# Patient Record
Sex: MALE | Race: OTHER
[De-identification: names, ages, dates, MRNs, and addresses within clinical notes are randomized per-mention and may not be internally consistent; named-entity substitution may affect disease eponyms.]

---

## 2020-03-09 PROBLEM — Z00.00 ENCOUNTER FOR PREVENTIVE HEALTH EXAMINATION: Status: ACTIVE | Noted: 2020-03-09

## 2020-03-12 ENCOUNTER — APPOINTMENT (OUTPATIENT)
Dept: ORTHOPEDIC SURGERY | Facility: CLINIC | Age: 55
End: 2020-03-12
Payer: MEDICARE

## 2020-03-15 ENCOUNTER — FORM ENCOUNTER (OUTPATIENT)
Age: 55
End: 2020-03-15

## 2020-03-16 ENCOUNTER — OUTPATIENT (OUTPATIENT)
Dept: OUTPATIENT SERVICES | Facility: HOSPITAL | Age: 55
LOS: 1 days | End: 2020-03-16
Payer: MEDICARE

## 2020-03-16 ENCOUNTER — APPOINTMENT (OUTPATIENT)
Dept: ORTHOPEDIC SURGERY | Facility: CLINIC | Age: 55
End: 2020-03-16
Payer: MEDICARE

## 2020-03-16 VITALS — OXYGEN SATURATION: 98 % | DIASTOLIC BLOOD PRESSURE: 70 MMHG | HEART RATE: 64 BPM | SYSTOLIC BLOOD PRESSURE: 128 MMHG

## 2020-03-16 DIAGNOSIS — Z86.39 PERSONAL HISTORY OF OTHER ENDOCRINE, NUTRITIONAL AND METABOLIC DISEASE: ICD-10-CM

## 2020-03-16 DIAGNOSIS — M25.531 PAIN IN RIGHT WRIST: ICD-10-CM

## 2020-03-16 DIAGNOSIS — Z80.1 FAMILY HISTORY OF MALIGNANT NEOPLASM OF TRACHEA, BRONCHUS AND LUNG: ICD-10-CM

## 2020-03-16 PROCEDURE — 99204 OFFICE O/P NEW MOD 45 MIN: CPT | Mod: 25

## 2020-03-16 PROCEDURE — 73110 X-RAY EXAM OF WRIST: CPT | Mod: 26,RT

## 2020-03-16 PROCEDURE — 73110 X-RAY EXAM OF WRIST: CPT

## 2020-03-16 PROCEDURE — 20611 DRAIN/INJ JOINT/BURSA W/US: CPT | Mod: RT

## 2020-03-16 RX ORDER — GABAPENTIN 600 MG/1
600 TABLET, COATED ORAL
Refills: 0 | Status: ACTIVE | COMMUNITY

## 2020-03-16 RX ORDER — METFORMIN HYDROCHLORIDE 500 MG/1
500 TABLET, COATED ORAL
Refills: 0 | Status: ACTIVE | COMMUNITY

## 2020-03-16 RX ORDER — ROSUVASTATIN CALCIUM 40 MG/1
40 TABLET, FILM COATED ORAL
Refills: 0 | Status: ACTIVE | COMMUNITY

## 2020-03-16 RX ORDER — KRILL/OM-3/DHA/EPA/PHOSPHO/AST 1000-230MG
CAPSULE ORAL
Refills: 0 | Status: ACTIVE | COMMUNITY

## 2020-03-16 NOTE — HISTORY OF PRESENT ILLNESS
[8] : a current pain level of 8/10 [de-identified] : The patient is a 54 year old, RHD man with history of controlled DM presenting with right wrist pain.\par \par The patient presents with a 1.5 month history of atraumatic, right volar wrist pain and numbness.  He has a history of remote left carpal tunnel syndrome s/p carpal tunnel release which provided permanent relief of symptoms.  He has apparently had EMG/NCS studies confirming his diagnosis.  His right wrist symptoms are somewhat similar to his left wrist symptoms.  He denies atrophy of hand muscles.  His numbness radiates to the majority of his hand.  He denies joint swelling.  He denies focal neck pain.  His pain sometimes radiates up his forearm.  In the past, he has tried bracing, NSAIDs, and had a cortisone injection greater than one year ago which provided significant long term relief.\par \par Pain is rated 8/10, described a shooting/throbbing, improved with NSAIDs, worse with lying down.

## 2020-03-16 NOTE — PROCEDURE
[de-identified] : Ultrasound Guided Injection \par Indication: Ensure placement within the carpal tunnel, with close proximity to the median nerve, without damaging the tendons\par \par Utlizing the Adaptive TCR  portable ultrasound machine, the Linear transducer, sterilized probe, using ultrasound guidance with the probe in the transverse axis, utilizing an out of plane approach, was used for the following injection:\par \par Injection: RIGHT wrist.\par Indication: Carpal Tunnel Syndrome.\par \par A discussion was had with the patient regarding this procedure and all questions were answered. All risks, benefits and alternatives were discussed. These included but were not limited to bleeding, infection, and allergic reaction. A timeout was performed prior to the procedure to ensure proper side.  Alcohol was used to clean and sterilize the skin over the volar aspect of the MCP. A 25-gauge needle was used to inject 0.5cc of lidocaine 1% without epinephrine, 0.5CC of 0.5% bupivicaine and 1cc of 6mg/mL betamethasone into the carpal tunnel from a volar ulnar approach with the wrist in flexion. A sterile bandage was then applied. The patient tolerated the procedure well and there were no complications.

## 2020-03-16 NOTE — DISCUSSION/SUMMARY
[Medication Risks Reviewed] : Medication risks reviewed [de-identified] : The patient is a 54 year old, RHD man presenting with chronic, volar wrist pain and numbness likely secondary to carpal-tunnel syndrome/median compressive neuropathy.\par \par Imaging was reviewed with the patient in detail.  All questions were answered appropriately.\par \par The patient was counseled on the natural progression of the problem today.  The patient was provided reassurance today.  \par \par After informed consent, and explanation of risks, benefits, alternatives, adverse effects of injection, which includes but is not limited to infection, bleeding, allergic reaction, swelling, failure to improve symptoms, the patient would like to proceed with the procedure - RIGHT WRIST ULTRASOUND-GUIDED CORTICOSTEROID INJECTION OF CARPAL TUNNEL. See procedure note above. Patient tolerated the procedure well. The patient was provided with postinjection instructions.\par \par He has done a course of conservative treatment.  We discussed consultation with hand/wrist surgery if cortisone injection does not provide relief.  He was given referral to hand/wrist surgery.\par \par Follow-up in 6 weeks or as needed.\par \par Patient appreciates and agrees with current plan.\par \par This note was generated using dragon medical dictation software.  A reasonable effort has been made for proofreading its contents, but typos may still remain.  If there are any questions or points of clarification needed please notify my office.\par \par \par

## 2020-03-16 NOTE — PHYSICAL EXAM
[de-identified] : General: Well-nourished, well-developed, alert, and in no acute distress.\par Head: Normocephalic.\par Eyes: Pupils equal round reactive to light and accommodation, extraocular muscles intact, normal sclera.\par Nose: No nasal discharge.\par Cardiac: Regular rate. Extremities are warm and well perfused. Distal pulses are symmetric bilaterally.\par Respiratory: No labored breathing.\par Extremities: Sensation is intact distally bilaterally.  Distal pulses are symmetric bilaterally\par Neurologic: No focal deficits\par Skin: Normal skin color, texture, and turgor\par Psychiatric: Normal affect\par \par RIGHT WRIST:\par \par Inspection: no swelling, bruising, deformity\par Joint Effusion: no\par ROM: normal wrist extension, wrist flexion , supination , pronation \par Provocative: no pain with ulnar deviation, no pain with radial deviation\par Palpation: no pain in radial styloid, no pain at anatomical snuffbox, no  pain at a scaphoid tubercle, no pain in CMC, no pain at scapholunate, no pain at DRUJ, no pain at ulnar styloid, no pain at TFCC, no pain at dorsal compartment, no pain at volar compartment\par Strength: normal pinch grasp, thumb abduction, thumb adduction, thumb opposition, MCP flexion, MCP extension PIP flexion, PIP extension DIP flexion, DIP extension\par Distal Pulses: 2+, normal\par Upper Extremity Sensation: normal\par Upper Extremity Reflexes: normal\par \par Special Tests:\par CMC Grind Test: normal\par Tinel: POSITIVE\par Phalen: POSITIVE\par TFCC Compression: normal\par Finkelstein Test: normal\par \par LEFT WRIST:\par \par Inspection: no swelling, bruising, deformity\par Joint Effusion: no\par ROM: normal wrist extension, wrist flexion , supination , pronation \par Provocative: no pain with ulnar deviation, no pain with radial deviation\par Palpation: no pain in radial styloid, no pain at anatomical snuffbox, no  pain at a scaphoid tubercle, no pain in CMC, no pain at scapholunate, no pain at DRUJ, no pain at ulnar styloid, no pain at TFCC, no pain at dorsal compartment, no pain at volar compartment\par Strength: normal pinch grasp, thumb abduction, thumb adduction, thumb opposition, MCP flexion, MCP extension PIP flexion, PIP extension DIP flexion, DIP extension\par Distal Pulses: 2+, normal\par Upper Extremity Sensation: normal\par Upper Extremity Reflexes: normal\par \par  [de-identified] : Xray RIGHT Wrist - Multiple views were reviewed with the patient in detail.  There is no acute fracture or dislocation.  There is no joint effusion. Questionable narrowing of radiocarpal joint.  Cystic changes at Lunate-Triquetrum.  We will await formal radiology reading.\par

## 2021-01-26 ENCOUNTER — OUTPATIENT (OUTPATIENT)
Dept: OUTPATIENT SERVICES | Facility: HOSPITAL | Age: 56
LOS: 1 days | End: 2021-01-26
Payer: MEDICARE

## 2021-01-26 ENCOUNTER — APPOINTMENT (OUTPATIENT)
Dept: ORTHOPEDIC SURGERY | Facility: CLINIC | Age: 56
End: 2021-01-26
Payer: MEDICARE

## 2021-01-26 ENCOUNTER — RESULT REVIEW (OUTPATIENT)
Age: 56
End: 2021-01-26

## 2021-01-26 VITALS
WEIGHT: 210 LBS | DIASTOLIC BLOOD PRESSURE: 77 MMHG | HEIGHT: 74 IN | HEART RATE: 72 BPM | OXYGEN SATURATION: 98 % | BODY MASS INDEX: 26.95 KG/M2 | TEMPERATURE: 97.4 F | SYSTOLIC BLOOD PRESSURE: 116 MMHG

## 2021-01-26 DIAGNOSIS — G89.29 CERVICALGIA: ICD-10-CM

## 2021-01-26 DIAGNOSIS — M54.2 CERVICALGIA: ICD-10-CM

## 2021-01-26 PROCEDURE — 72050 X-RAY EXAM NECK SPINE 4/5VWS: CPT

## 2021-01-26 PROCEDURE — 99072 ADDL SUPL MATRL&STAF TM PHE: CPT

## 2021-01-26 PROCEDURE — 72050 X-RAY EXAM NECK SPINE 4/5VWS: CPT | Mod: 26

## 2021-01-26 PROCEDURE — 99214 OFFICE O/P EST MOD 30 MIN: CPT

## 2021-02-09 NOTE — HISTORY OF PRESENT ILLNESS
[de-identified] : Referred by Dr. Mayberry\par \par Mr. PERALTA is a very pleasant 55 year old male who complains of neck pain for approximately the past 2-3 months, atraumatic. Occasional upper extremity pain, primarily in his hands. Patient denies extremity numbness and paresthesias. History of carpal tunnel, s/p remote left carpal tunnel release.\par \par The patient had surgery on his lumbar spine in 2002.\par \par The patient has no history of unexpected weight loss, no history of active cancer, no history bladder or bowel dysfunction, no night pain, no fevers or chills.\par \par The past medical history, surgical history, family history, allergies, medications, 10+ point review of systems, family history and social history were reviewed and non contributory.\par \par

## 2021-02-09 NOTE — DISCUSSION/SUMMARY
[de-identified] : Discussed the results of the patient's history, physical exam, and imaging. There is no surgical indication for intervention at this time. I am recommending a course of physical therapy, referral given.  The patient will follow up with me at the end of the physical therapy course, in approximately 2-3 months, sooner if there is an issue.  All questions were answered.\par \par

## 2021-02-09 NOTE — PHYSICAL EXAM
[de-identified] : MRI cervical 12/2020 stand-up (my read): multilevel disc degeneration and subligamentous disc herniations; no cord compression or cord signal change\par \par XR cervical 1/2021 (my read) multilevel degenerative changes with anterior osteophytes, no dynamic instability, no acute fractures seen

## 2022-09-28 RX ORDER — NAPROXEN 500 MG/1
TABLET ORAL
Refills: 0 | Status: ACTIVE | COMMUNITY

## 2022-09-28 RX ORDER — CELECOXIB 200 MG/1
200 CAPSULE ORAL
Refills: 0 | Status: ACTIVE | COMMUNITY

## 2022-10-12 ENCOUNTER — APPOINTMENT (OUTPATIENT)
Dept: SPINE | Facility: CLINIC | Age: 57
End: 2022-10-12

## 2022-10-12 ENCOUNTER — NON-APPOINTMENT (OUTPATIENT)
Age: 57
End: 2022-10-12

## 2023-01-31 ENCOUNTER — NON-APPOINTMENT (OUTPATIENT)
Age: 58
End: 2023-01-31

## 2023-01-31 ENCOUNTER — APPOINTMENT (OUTPATIENT)
Dept: SPINE | Facility: CLINIC | Age: 58
End: 2023-01-31

## 2023-01-31 VITALS
BODY MASS INDEX: 28.88 KG/M2 | OXYGEN SATURATION: 99 % | HEIGHT: 74 IN | HEART RATE: 77 BPM | WEIGHT: 225 LBS | RESPIRATION RATE: 18 BRPM | SYSTOLIC BLOOD PRESSURE: 136 MMHG | TEMPERATURE: 97 F | DIASTOLIC BLOOD PRESSURE: 81 MMHG

## 2023-01-31 DIAGNOSIS — M54.10 RADICULOPATHY, SITE UNSPECIFIED: ICD-10-CM

## 2023-01-31 DIAGNOSIS — Z78.9 OTHER SPECIFIED HEALTH STATUS: ICD-10-CM

## 2023-01-31 DIAGNOSIS — M54.50 LOW BACK PAIN, UNSPECIFIED: ICD-10-CM

## 2023-01-31 DIAGNOSIS — Z86.39 PERSONAL HISTORY OF OTHER ENDOCRINE, NUTRITIONAL AND METABOLIC DISEASE: ICD-10-CM

## 2023-01-31 RX ORDER — GABAPENTIN 400 MG/1
CAPSULE ORAL
Refills: 0 | Status: DISCONTINUED | COMMUNITY
End: 2023-01-31

## 2023-02-14 ENCOUNTER — APPOINTMENT (OUTPATIENT)
Dept: SPINE | Facility: CLINIC | Age: 58
End: 2023-02-14
Payer: MEDICARE

## 2023-02-14 ENCOUNTER — RESULT REVIEW (OUTPATIENT)
Age: 58
End: 2023-02-14

## 2023-02-14 ENCOUNTER — NON-APPOINTMENT (OUTPATIENT)
Age: 58
End: 2023-02-14

## 2023-02-14 ENCOUNTER — OUTPATIENT (OUTPATIENT)
Dept: OUTPATIENT SERVICES | Facility: HOSPITAL | Age: 58
LOS: 1 days | End: 2023-02-14
Payer: MEDICARE

## 2023-02-14 VITALS
WEIGHT: 225 LBS | OXYGEN SATURATION: 98 % | RESPIRATION RATE: 18 BRPM | TEMPERATURE: 97 F | DIASTOLIC BLOOD PRESSURE: 79 MMHG | SYSTOLIC BLOOD PRESSURE: 127 MMHG | BODY MASS INDEX: 28.88 KG/M2 | HEIGHT: 74 IN | HEART RATE: 67 BPM

## 2023-02-14 DIAGNOSIS — M47.812 SPONDYLOSIS W/OUT MYELOPATHY OR RADICULOPATHY, CERVICAL REGION: ICD-10-CM

## 2023-02-14 DIAGNOSIS — M54.12 RADICULOPATHY, CERVICAL REGION: ICD-10-CM

## 2023-02-14 DIAGNOSIS — M48.02 SPINAL STENOSIS, CERVICAL REGION: ICD-10-CM

## 2023-02-14 DIAGNOSIS — R20.2 ANESTHESIA OF SKIN: ICD-10-CM

## 2023-02-14 DIAGNOSIS — R20.0 ANESTHESIA OF SKIN: ICD-10-CM

## 2023-02-14 PROCEDURE — 72052 X-RAY EXAM NECK SPINE 6/>VWS: CPT

## 2023-02-14 PROCEDURE — 99204 OFFICE O/P NEW MOD 45 MIN: CPT

## 2023-02-14 PROCEDURE — 72052 X-RAY EXAM NECK SPINE 6/>VWS: CPT | Mod: 26

## 2023-02-22 ENCOUNTER — APPOINTMENT (OUTPATIENT)
Dept: ORTHOPEDIC SURGERY | Facility: CLINIC | Age: 58
End: 2023-02-22
Payer: MEDICARE

## 2023-02-22 ENCOUNTER — NON-APPOINTMENT (OUTPATIENT)
Age: 58
End: 2023-02-22

## 2023-02-22 DIAGNOSIS — M77.11 LATERAL EPICONDYLITIS, RIGHT ELBOW: ICD-10-CM

## 2023-02-22 PROCEDURE — 73070 X-RAY EXAM OF ELBOW: CPT | Mod: RT

## 2023-02-22 PROCEDURE — 99203 OFFICE O/P NEW LOW 30 MIN: CPT

## 2023-02-22 PROCEDURE — 73110 X-RAY EXAM OF WRIST: CPT | Mod: RT

## 2023-04-10 ENCOUNTER — APPOINTMENT (OUTPATIENT)
Age: 58
End: 2023-04-10
Payer: MEDICARE

## 2023-04-10 DIAGNOSIS — G56.21 LESION OF ULNAR NERVE, RIGHT UPPER LIMB: ICD-10-CM

## 2023-04-10 DIAGNOSIS — G56.01 CARPAL TUNNEL SYNDROME, RIGHT UPPER LIMB: ICD-10-CM

## 2023-04-10 PROCEDURE — 99203 OFFICE O/P NEW LOW 30 MIN: CPT

## 2023-04-10 PROCEDURE — 95911 NRV CNDJ TEST 9-10 STUDIES: CPT

## 2023-04-10 PROCEDURE — 95886 MUSC TEST DONE W/N TEST COMP: CPT

## 2023-04-10 NOTE — CONSULT LETTER
[Dear  ___] : Dear ~AVELINO, [Courtesy Letter:] : I had the pleasure of seeing your patient, [unfilled], in my office today. [Please see my note below.] : Please see my note below. [Sincerely,] : Sincerely, [FreeTextEntry3] : Toni Simpson MD

## 2023-04-10 NOTE — PHYSICAL EXAM
[FreeTextEntry1] : General: this is a pleasant patient in no acute distress\par \par HEENT conjunctiva are normal, no tenderness in head\par \par CV: normal pulses, regular rate and rhythm, no peripheral edema noted\par \par Lungs: breathing is non-labored\par \par abd: soft and non-distended\par \par MSK:\par SLR: \par RA:\par range of motion:\par tinnels: \par spurling:\par Occipital nerve tenderness:\par \par Mental status:\par Alert and oriented to person, place and time, normal speech and comprehension\par \par Cranial Nerves:\par extra-occular movements in tact without nystagmus, normal saccades and smooth pursuit, Face symmetric and facial strength symmetric, facial sensation symmetric, \par \par Motor: normal bulk and tone throughout. no abnormal movements.  Full 5/5 strength uppers and lower extremities proximally and distally except R hand intrinsics 4/5\par \par Sensory: in tact and symmetric to vibration, light tough, temperature\par \par Cerebellar: normal finger-nose-finger bilaterally\par \par Reflexes: 1+ in the upper and lower extremities and symmetric.  toes are bilaterally downgoing.\par \par Gait: stable, able to tip toe heel and tandem\par \par Stances:\par Romberg: normal\par \par \par

## 2023-04-10 NOTE — ASSESSMENT
[FreeTextEntry1] : EMG/NCS of right arm showed moderate right cubital and carpal tunnel syndrome\par \par EMG REPORT WILL BE UPLOADED SEPARATELY AS A PDF\par

## 2023-04-10 NOTE — DATA REVIEWED
[de-identified] : 2023: XR C spine C6-C7 mod spondylosis and froaminal narrowing with mild motion at C5-C6\par MRI C spine 2020 worst level C6-C7\par MRI C spine 2023 similar to 2020 with worst level C6-C7

## 2023-04-10 NOTE — HISTORY OF PRESENT ILLNESS
[FreeTextEntry1] : LIANNA PERALTA is a 57 year who is referred for clinical neurophysiological consultation and EMG/NCS for numbness and weakness and pain in the right hand. \par \par about 1 year of progressive symptoms. numbness in finger tips, difficulty closing hand. mainly middle 3 fingers into the palm and into the forearm which is painful.  can't tell provoking or alleviating factors.  needs to sleep with arm stretched forward, if he bends the elbow at night is better. does wake him from sleep most nights.  some issues opening jars and doing buttons.  shoulder is also weak.  no neck pain.  almost no symptoms in the left arm or feet. \par \par for 5 months he has been trying to sleep with the elbow straight and keeping it straight as much as possible during the day.\par \par Denies diplopia, blurred vision, dysphagia, dysarthria, aphasia, bowel or bladder dysfunction, imbalance, falls, headaches.

## 2024-08-27 NOTE — HISTORY OF PRESENT ILLNESS
[de-identified] : Mr. PERALTA is a 59 year old man who was referred by  __  for cervical lymphadenopathy.  He was noted to have right parotid enlargement.   US SOFT TISSUE NECK (6-) at [facility] - COMPARISON:  None - RIGHT parotid gland measures approximately 5.6 x 1.8 x 4.6 cm. No discrete mass, calculus, or ductal dilatation. - LEFT parotid gland measures 5.4 x 1.7 x 4.7 cm. No discrete mass, calculus, or ductal dilatation. - 2.3 x 0.5 x 1.3 cm right level 3 normal-appearing cervical lymph node. IMPRESSION: 1.)  Parotid glands are symmetric in size without discrete mass, calculus or ductal dilatation. 2.)  Nonenlarged normal-appearing right level 3 cervical lymph node. No suspicious lymph nodes identified. 3.)  If further imaging is clinically indicated CT scan or MRI examination of the neck without and with intravenous contrast may provide additional information.

## 2024-08-28 ENCOUNTER — NON-APPOINTMENT (OUTPATIENT)
Age: 59
End: 2024-08-28

## 2024-08-28 ENCOUNTER — APPOINTMENT (OUTPATIENT)
Dept: OTOLARYNGOLOGY | Facility: CLINIC | Age: 59
End: 2024-08-28

## 2024-08-28 VITALS
WEIGHT: 215 LBS | HEART RATE: 64 BPM | TEMPERATURE: 96.7 F | SYSTOLIC BLOOD PRESSURE: 131 MMHG | BODY MASS INDEX: 27.59 KG/M2 | HEIGHT: 74 IN | DIASTOLIC BLOOD PRESSURE: 97 MMHG

## 2024-08-28 DIAGNOSIS — F17.200 NICOTINE DEPENDENCE, UNSPECIFIED, UNCOMPLICATED: ICD-10-CM

## 2024-08-28 DIAGNOSIS — R59.0 LOCALIZED ENLARGED LYMPH NODES: ICD-10-CM

## 2024-08-28 PROCEDURE — 99204 OFFICE O/P NEW MOD 45 MIN: CPT | Mod: 25

## 2024-08-28 PROCEDURE — 31575 DIAGNOSTIC LARYNGOSCOPY: CPT

## 2024-08-28 NOTE — CONSULT LETTER
[Dear  ___] : Dear ~AVELINO, [( Thank you for referring [unfilled] for consultation for _____ )] : Thank you for referring [unfilled] for consultation for [unfilled] [Please see my note below.] : Please see my note below. [Consult Closing:] : Thank you very much for allowing me to participate in the care of this patient.  If you have any questions, please do not hesitate to contact me. [Sincerely,] : Sincerely,

## 2024-09-05 PROBLEM — R59.0 CERVICAL ADENOPATHY: Status: ACTIVE | Noted: 2024-08-28

## 2024-09-05 PROBLEM — F17.200 HEAVY SMOKER: Status: ACTIVE | Noted: 2024-08-28

## 2024-09-05 RX ORDER — OMEPRAZOLE 20 MG/1
20 CAPSULE, DELAYED RELEASE ORAL
Refills: 0 | Status: ACTIVE | COMMUNITY

## 2024-09-05 RX ORDER — SEMAGLUTIDE 1.34 MG/ML
2 INJECTION, SOLUTION SUBCUTANEOUS
Refills: 0 | Status: ACTIVE | COMMUNITY

## 2024-09-05 NOTE — PHYSICAL EXAM
[FreeTextEntry1] : No hoarseness.  [de-identified] : 2.5 cm soft mobile LN in right level 2A. [de-identified] : Thyroid gland normal size, no palpable nodules.  [de-identified] : 1+ bilateral  [de-identified] : Carotid pulses 2+ bilateral.  [de-identified] : See NECK

## 2024-09-05 NOTE — ASSESSMENT
[FreeTextEntry1] : Mr. PERALTA is a 59-year-old man who has a painless right level 2 lymph node, 2.3 cm on US, found on routine exam a few months ago.  He is a longtime smoker.   H&N exam is unremarkable other than the lymph node.  PLAN CT neck+ contrrast for better evaluation  Return in a few weeks

## 2024-09-05 NOTE — HISTORY OF PRESENT ILLNESS
[de-identified] : Mr. PERALTA is a 59-year-old man who was referred by SHANTELLE Rivera for cervical lymphadenopathy. He was noted to have right parotid enlargement by SHANTELLE Rivera on routine visit in May 2024.   Pt denies feeling mass, pressure, difficulty breathing, difficulty swallowing or voice change.  US Neck was done. Longtime smoker - 1/2 pack/day  for >30 years.  Occasional ETOH. Retired .  No [employer] 9/11 exposure.   US SOFT TISSUE NECK (6-) at [facility] - COMPARISON:  None - RIGHT parotid gland measures approximately 5.6 x 1.8 x 4.6 cm. No discrete mass, calculus, or ductal dilatation. - LEFT parotid gland measures 5.4 x 1.7 x 4.7 cm. No discrete mass, calculus, or ductal dilatation. - 2.3 x 0.5 x 1.3 cm right level 3 normal-appearing cervical lymph node. IMPRESSION: 1.)  Parotid glands are symmetric in size without discrete mass, calculus or ductal dilatation. 2.)  Nonenlarged normal-appearing right level 3 cervical lymph node. No suspicious lymph nodes identified. 3.)  If further imaging is clinically indicated CT scan or MRI examination of the neck without and with intravenous contrast may provide additional information.

## 2024-09-05 NOTE — PROCEDURE
[de-identified] : LARYNGOSCOPY EXAM:  Indication:  cervical lymphadenopathy  -Verbal consent was obtained from patient prior to procedure. -Oxymetazoline 0.05% spray applied to the nasal cavities. Flexible laryngoscopy was performed via left nostril and revealed the following:   -- Nasopharynx had no mass or exudate.   -- Base of tongue was symmetric and not enlarged.   -- Vallecula was clear   -- Epiglottis, both aryepiglottic folds and both false vocal folds were normal   -- Arytenoids both with mild edema and no erythema    -- True vocal folds were fully mobile and without lesions.    -- Post cricoid area was clear.   -- Interarytenoid edema was present.   -- No lesions seen in laryngopharynx The patient tolerated the procedure well.

## 2024-09-05 NOTE — CONSULT LETTER
[FreeTextEntry2] : SHANTELLE Rivera [address] [place] [address]   [FreeTextEntry3] :  Keely Markham MD  Otolaryngology, Head and Neck Surgery

## 2024-09-05 NOTE — HISTORY OF PRESENT ILLNESS
[de-identified] : Mr. PERALTA is a 59-year-old man who was referred by SHANTELLE Rivera for cervical lymphadenopathy. He was noted to have right parotid enlargement by SHANTELLE Rivera on routine visit in May 2024.   Pt denies feeling mass, pressure, difficulty breathing, difficulty swallowing or voice change.  US Neck was done. Longtime smoker - 1/2 pack/day  for >30 years.  Occasional ETOH. Retired .  No [employer] 9/11 exposure.   US SOFT TISSUE NECK (6-) at [facility] - COMPARISON:  None - RIGHT parotid gland measures approximately 5.6 x 1.8 x 4.6 cm. No discrete mass, calculus, or ductal dilatation. - LEFT parotid gland measures 5.4 x 1.7 x 4.7 cm. No discrete mass, calculus, or ductal dilatation. - 2.3 x 0.5 x 1.3 cm right level 3 normal-appearing cervical lymph node. IMPRESSION: 1.)  Parotid glands are symmetric in size without discrete mass, calculus or ductal dilatation. 2.)  Nonenlarged normal-appearing right level 3 cervical lymph node. No suspicious lymph nodes identified. 3.)  If further imaging is clinically indicated CT scan or MRI examination of the neck without and with intravenous contrast may provide additional information.

## 2024-09-05 NOTE — CONSULT LETTER
[FreeTextEntry2] : SHANTELLE Rivera [address] [place] [address]   [FreeTextEntry3] :  Keely Markham MD  Otolaryngology, Head and Neck Surgery

## 2024-09-05 NOTE — PHYSICAL EXAM
[FreeTextEntry1] : No hoarseness.  [de-identified] : 2.5 cm soft mobile LN in right level 2A. [de-identified] : Thyroid gland normal size, no palpable nodules.  [de-identified] : 1+ bilateral  [de-identified] : Carotid pulses 2+ bilateral.  [de-identified] : See NECK

## 2024-09-05 NOTE — PROCEDURE
Pt has hx of RCC left kidney, S/P left nephrectomy and partial pancreatectomy with Dr. Love on 8/2. Has had decreased appetite, malaise, and abdominal pain since then.  Recently has been having episodes of chills as well as flushing and diaphoresis.  No recorded fevers. Outside CT shows fluid collectio IR performed imaging guided drainage of the fluid and placement of drain on 9/3. Minimal output relatively unchanged on repeat CT A/P.  -Per Dr. JOHNSON, likely hematoma; dc'd SARAH drain  -d/c Zosyn started  -Pain control, antiemetics ordered   -CLD, advance as tolerated, if tolerates will d/c TPN     -Oncology consulted, pt was supposed to see Dr. Barclay next Thursday (9/14/23) for initiation of systemic therapy     [de-identified] : LARYNGOSCOPY EXAM:  Indication:  cervical lymphadenopathy  -Verbal consent was obtained from patient prior to procedure. -Oxymetazoline 0.05% spray applied to the nasal cavities. Flexible laryngoscopy was performed via left nostril and revealed the following:   -- Nasopharynx had no mass or exudate.   -- Base of tongue was symmetric and not enlarged.   -- Vallecula was clear   -- Epiglottis, both aryepiglottic folds and both false vocal folds were normal   -- Arytenoids both with mild edema and no erythema    -- True vocal folds were fully mobile and without lesions.    -- Post cricoid area was clear.   -- Interarytenoid edema was present.   -- No lesions seen in laryngopharynx The patient tolerated the procedure well.

## 2024-09-16 NOTE — HISTORY OF PRESENT ILLNESS
[FreeTextEntry1] : 59 year old male with history of right wrist carpal and cubital tunnel syndrome presents for evaluation of his right hand pain and numbness. Patient states this issue has been going on for about 1 year, patient has not been seen previously for this issue. Patient states/denies a precipitating injury to the affected area. Patient states/denies the numbness and pain have been worsening over time.

## 2024-09-18 ENCOUNTER — APPOINTMENT (OUTPATIENT)
Dept: OTOLARYNGOLOGY | Facility: CLINIC | Age: 59
End: 2024-09-18
Payer: MEDICARE

## 2024-09-18 VITALS
SYSTOLIC BLOOD PRESSURE: 150 MMHG | DIASTOLIC BLOOD PRESSURE: 93 MMHG | BODY MASS INDEX: 27.85 KG/M2 | HEART RATE: 73 BPM | WEIGHT: 217 LBS | HEIGHT: 74 IN | TEMPERATURE: 96.1 F

## 2024-09-18 DIAGNOSIS — R59.0 LOCALIZED ENLARGED LYMPH NODES: ICD-10-CM

## 2024-09-18 PROCEDURE — 99212 OFFICE O/P EST SF 10 MIN: CPT

## 2024-09-18 NOTE — CONSULT LETTER
[Dear  ___] : Dear ~AVELINO, [Please see my note below.] : Please see my note below. [Consult Closing:] : Thank you very much for allowing me to participate in the care of this patient.  If you have any questions, please do not hesitate to contact me. [Sincerely,] : Sincerely, [Courtesy Letter:] : I had the pleasure of seeing your patient, [unfilled], in my office today. [FreeTextEntry2] : SHANTELLE Rivera [address] [place] [address]   [FreeTextEntry3] :  Keely Markham MD  Otolaryngology, Head and Neck Surgery     [___] : [unfilled]

## 2024-09-18 NOTE — ASSESSMENT
[FreeTextEntry1] : Mr. PERALTA is a 59-year-old man who has had resolution of right upper jugular lymph node, first seen on neck US in June 2024.  CT neck only shows 3-4 mm LN in that area. I shared the CT findings with him and provided reassurance.  Return as needed.

## 2024-09-18 NOTE — PHYSICAL EXAM
[FreeTextEntry1] : No hoarseness.  [de-identified] : Thyroid gland normal size, no palpable nodules.  [Normal] : no neck adenopathy

## 2024-09-18 NOTE — HISTORY OF PRESENT ILLNESS
[de-identified] : INITIAL VISIT 8/28/2024 Mr. PERALTA is a 59-year-old man who was referred by SHANTELLE Rivera for cervical lymphadenopathy. He was noted to have right parotid enlargement by SHANTELLE Rivera on routine visit in May 2024.   Pt denies feeling mass, pressure, difficulty breathing, difficulty swallowing or voice change.  US Neck was done. Longtime smoker - 1/2 pack/day  for >30 years.  Occasional ETOH. Retired .  No [employer] 9/11 exposure.  VISIT 9/18/2024 Mr. PERALTA had CT neck done. He feels no neck mass or pressure.   CT NECK with contrast (9-) - COMPARISON:  Directed ultrasound soft tissue neck report 6/3/2024. Report CT lung screening exam 8/23/2024. * LYMPH NODES:  No enlarged, calcified, cystic or necrotic nodes identified. Bilaterally posterior to the carotid sheaths, 0.3-0.4 cm short axis dimension level 3 lymph nodes. * AERODIGESTIVE TRACT and SOFT TISSUES of ORAL CAVITY:  Poorly distended piriform sinuses. Otherwise aerodigestive tract is normal in appearance.  Soft tissues of the oral cavity are unremarkable. * MAJOR SALIVARY GLANDS:   Symmetric normal size isodense parotid glands.  Symmetric normal-sized isodense submandibular glands.  No sialolithiasis or focal mass. * THYROID:  Normal position. Mild asymmetric enlargement of the right lobe extending further into the tracheoesophageal groove. No substernal extension.  Within the limitations of CT, no discrete nodule. No tracheal or esophageal displacement, no retroesophageal positioning. Questionable right upper parathyroid gland, abutting the longus coli muscle 0.5 x 0.4 cm short axis dimension, series 4 axial image 77. * PARANASAL SINUSES, ORBITS and TYMPANOMASTOID CAVITIES:  Superior frontal sinuses not included. Lobulated fibro-osseous lesion right ethmoid sinus 1.4 x 0.7 x 1.0 cm. Tiny amount of amorphous material posterior right ethmoid air cell.  Bony nasal septal deviation rightward, cartilaginous septum better assessed directly. Symmetric proptosis. Symmetric grossly normal size extraocular muscles.  No orbital or ocular mass. Well-pneumatized grossly clear temporal bones. * SKELETON:  Diffuse cervical spondylosis and degenerative disc disease.  Left facet arthrosis multilevel. No aggressive osseous lesion.  Normal size intact bony sella turcica, exam does not evaluate for microadenoma. No significant bony degenerative changes TMJs * OTHER:  Included lung apices, no nodules or infiltrates. IMPRESSION:   1.) No enlarged, calcified, cystic or necrotic lymph nodes. Further workup/surveillance for palpable abnormality as clinically warranted. 2.) Lobulated fibro-osseous lesion right ethmoid sinus 1.0 x 0.7 x 1.0 cm. Tiny amount of amorphous material posterior right ethmoid air cell. 3.) Symmetric proptosis, no orbital or ocular mass or extraocular muscle enlargement. 4.) Mildly prominent right lobe of the thyroid. Questionable right upper parathyroid gland, overlying the medial longus coli muscle, 0.5 x 0.4 cm in short axis dimension, limited evaluation. (Images were reviewed.)    US SOFT TISSUE NECK (6-) at [facility] - COMPARISON:  None - RIGHT parotid gland measures approximately 5.6 x 1.8 x 4.6 cm. No discrete mass, calculus, or ductal dilatation. - LEFT parotid gland measures 5.4 x 1.7 x 4.7 cm. No discrete mass, calculus, or ductal dilatation. - 2.3 x 0.5 x 1.3 cm right level 3 normal-appearing cervical lymph node. IMPRESSION: 1.)  Parotid glands are symmetric in size without discrete mass, calculus or ductal dilatation. 2.)  Nonenlarged normal-appearing right level 3 cervical lymph node. No suspicious lymph nodes identified. 3.)  If further imaging is clinically indicated CT scan or MRI examination of the neck without and with intravenous contrast may provide additional information.

## 2024-09-24 ENCOUNTER — APPOINTMENT (OUTPATIENT)
Dept: ORTHOPEDIC SURGERY | Facility: CLINIC | Age: 59
End: 2024-09-24

## 2024-10-15 ENCOUNTER — APPOINTMENT (OUTPATIENT)
Dept: ORTHOPEDIC SURGERY | Facility: CLINIC | Age: 59
End: 2024-10-15
Payer: MEDICARE

## 2024-10-15 VITALS — WEIGHT: 210 LBS | HEIGHT: 74 IN | BODY MASS INDEX: 26.95 KG/M2 | RESPIRATION RATE: 16 BRPM

## 2024-10-15 DIAGNOSIS — R20.0 ANESTHESIA OF SKIN: ICD-10-CM

## 2024-10-15 DIAGNOSIS — R20.2 ANESTHESIA OF SKIN: ICD-10-CM

## 2024-10-15 PROCEDURE — 73110 X-RAY EXAM OF WRIST: CPT | Mod: LT,RT

## 2024-10-15 PROCEDURE — 99214 OFFICE O/P EST MOD 30 MIN: CPT | Mod: 25

## 2024-10-25 RX ORDER — OXYCODONE AND ACETAMINOPHEN 5; 325 MG/1; MG/1
5-325 TABLET ORAL
Qty: 8 | Refills: 0 | Status: ACTIVE | COMMUNITY
Start: 2024-10-25 | End: 1900-01-01

## 2024-10-25 NOTE — ASU PATIENT PROFILE, ADULT - NS PREOP UNDERSTANDS INFO
Time by MD, as per MD nothing to eat or drink after midnight Sunday; patient reminded to come with photo ID/insurance/credit card; dress in comfortable clothes; no jewelries/contact lens/valuable; no smoking/alcohol drinking/recreational drug use Sunday; escort to have photo ID; address and callback number was given/yes

## 2024-10-25 NOTE — ASU PATIENT PROFILE, ADULT - FALL HARM RISK - UNIVERSAL INTERVENTIONS
Bed in lowest position, wheels locked, appropriate side rails in place/Call bell, personal items and telephone in reach/Instruct patient to call for assistance before getting out of bed or chair/Non-slip footwear when patient is out of bed/[place] to call system/Physically safe environment - no spills, clutter or unnecessary equipment/Purposeful Proactive Rounding/Room/bathroom lighting operational, light cord in reach

## 2024-10-27 NOTE — ASU DISCHARGE PLAN (ADULT/PEDIATRIC) - ASU DC SPECIAL INSTRUCTIONSFT
Do not remove dressing/bandage  Keep dressing clean and dry  Digital range of motion  Hand elevation

## 2024-10-27 NOTE — ASU DISCHARGE PLAN (ADULT/PEDIATRIC) - FINANCIAL ASSISTANCE
[facility] provides services at a reduced cost to those who are determined to be eligible through [facility]’s financial assistance program. Information regarding [facility]’s financial assistance program can be found by going to [email or web address] or by calling [phone].

## 2024-10-28 ENCOUNTER — APPOINTMENT (OUTPATIENT)
Dept: ORTHOPEDIC SURGERY | Facility: AMBULATORY SURGERY CENTER | Age: 59
End: 2024-10-28

## 2024-10-28 ENCOUNTER — TRANSCRIPTION ENCOUNTER (OUTPATIENT)
Age: 59
End: 2024-10-28

## 2024-10-28 ENCOUNTER — OUTPATIENT (OUTPATIENT)
Dept: OUTPATIENT SERVICES | Facility: HOSPITAL | Age: 59
LOS: 1 days | Discharge: HOME | End: 2024-10-28
Payer: MEDICARE

## 2024-10-28 VITALS
HEART RATE: 72 BPM | HEIGHT: 74 IN | OXYGEN SATURATION: 100 % | TEMPERATURE: 98 F | RESPIRATION RATE: 14 BRPM | SYSTOLIC BLOOD PRESSURE: 129 MMHG | WEIGHT: 215.83 LBS | DIASTOLIC BLOOD PRESSURE: 83 MMHG

## 2024-10-28 VITALS
DIASTOLIC BLOOD PRESSURE: 76 MMHG | OXYGEN SATURATION: 97 % | RESPIRATION RATE: 16 BRPM | HEART RATE: 67 BPM | SYSTOLIC BLOOD PRESSURE: 128 MMHG | TEMPERATURE: 97 F

## 2024-10-28 DIAGNOSIS — Z98.890 OTHER SPECIFIED POSTPROCEDURAL STATES: Chronic | ICD-10-CM

## 2024-10-28 LAB — GLUCOSE BLDC GLUCOMTR-MCNC: 145 MG/DL — HIGH (ref 70–99)

## 2024-10-28 PROCEDURE — 29848 WRIST ENDOSCOPY/SURGERY: CPT | Mod: RT

## 2024-10-28 RX ORDER — ASPIRIN/MAG CARB/ALUMINUM AMIN 325 MG
0 TABLET ORAL
Refills: 0 | DISCHARGE

## 2024-10-28 RX ORDER — SEMAGLUTIDE 1.34 MG/ML
0.5 INJECTION, SOLUTION SUBCUTANEOUS
Refills: 0 | DISCHARGE

## 2024-10-28 RX ORDER — ONDANSETRON HYDROCHLORIDE 2 MG/ML
4 INJECTION, SOLUTION INTRAMUSCULAR; INTRAVENOUS ONCE
Refills: 0 | Status: DISCONTINUED | OUTPATIENT
Start: 2024-10-28 | End: 2024-10-28

## 2024-10-28 RX ORDER — CHLORHEXIDINE GLUCONATE 40 MG/ML
1 SOLUTION TOPICAL ONCE
Refills: 0 | Status: COMPLETED | OUTPATIENT
Start: 2024-10-28 | End: 2024-10-28

## 2024-10-28 RX ORDER — ROSUVASTATIN CALCIUM 10 MG
1 TABLET ORAL
Refills: 0 | DISCHARGE

## 2024-10-28 RX ORDER — GABAPENTIN 300 MG/1
1 CAPSULE ORAL
Refills: 0 | DISCHARGE

## 2024-10-28 RX ORDER — ACETAMINOPHEN 500 MG
650 TABLET ORAL ONCE
Refills: 0 | Status: DISCONTINUED | OUTPATIENT
Start: 2024-10-28 | End: 2024-10-28

## 2024-10-28 RX ORDER — OXYCODONE HYDROCHLORIDE 30 MG/1
5 TABLET ORAL ONCE
Refills: 0 | Status: DISCONTINUED | OUTPATIENT
Start: 2024-10-28 | End: 2024-10-28

## 2024-10-28 RX ADMIN — CHLORHEXIDINE GLUCONATE 1 APPLICATION(S): 40 SOLUTION TOPICAL at 06:21

## 2024-10-28 NOTE — PRE-ANESTHESIA EVALUATION ADULT - NSANTHOSAYNRD_GEN_A_CORE
No. BARRY screening performed.  STOP BANG Legend: 0-2 = LOW Risk; 3-4 = INTERMEDIATE Risk; 5-8 = HIGH Risk

## 2024-11-19 ENCOUNTER — APPOINTMENT (OUTPATIENT)
Dept: ORTHOPEDIC SURGERY | Facility: CLINIC | Age: 59
End: 2024-11-19
Payer: MEDICARE

## 2024-11-19 DIAGNOSIS — G56.01 CARPAL TUNNEL SYNDROME, RIGHT UPPER LIMB: ICD-10-CM

## 2024-11-19 PROCEDURE — 99024 POSTOP FOLLOW-UP VISIT: CPT

## 2025-01-14 ENCOUNTER — APPOINTMENT (OUTPATIENT)
Dept: ORTHOPEDIC SURGERY | Age: 60
End: 2025-01-14
Payer: MEDICARE

## 2025-01-14 DIAGNOSIS — M72.0 PALMAR FASCIAL FIBROMATOSIS [DUPUYTREN]: ICD-10-CM

## 2025-01-14 PROCEDURE — 99214 OFFICE O/P EST MOD 30 MIN: CPT | Mod: 24

## 2025-05-20 ENCOUNTER — APPOINTMENT (OUTPATIENT)
Dept: DERMATOLOGY | Facility: CLINIC | Age: 60
End: 2025-05-20
Payer: MEDICARE

## 2025-05-20 ENCOUNTER — NON-APPOINTMENT (OUTPATIENT)
Age: 60
End: 2025-05-20

## 2025-05-20 DIAGNOSIS — L72.0 EPIDERMAL CYST: ICD-10-CM

## 2025-05-20 PROCEDURE — 99203 OFFICE O/P NEW LOW 30 MIN: CPT

## 2025-06-03 ENCOUNTER — APPOINTMENT (OUTPATIENT)
Dept: DERMATOLOGY | Facility: CLINIC | Age: 60
End: 2025-06-03

## 2025-06-03 DIAGNOSIS — D48.5 NEOPLASM OF UNCERTAIN BEHAVIOR OF SKIN: ICD-10-CM

## 2025-06-10 ENCOUNTER — APPOINTMENT (OUTPATIENT)
Dept: DERMATOLOGY | Facility: CLINIC | Age: 60
End: 2025-06-10

## (undated) DEVICE — GLV 8.5 PROTEXIS (WHITE)

## (undated) DEVICE — PACK HAND

## (undated) DEVICE — TOURNIQUET CUFF 24" DUAL PORT SINGLE BLADDER W PLC (BLACK)

## (undated) DEVICE — TOURNIQUET CUFF 18" DUAL PORT SINGLE BLADDER W PLC  (BLACK)

## (undated) DEVICE — GLV 7.5 PROTEXIS (WHITE)

## (undated) DEVICE — GLV 8 PROTEXIS (WHITE)

## (undated) DEVICE — SUT ETHILON 5-0 18" P-3

## (undated) DEVICE — SYS ENDO STRATOS RELEASE 30 DEG 4MM